# Patient Record
Sex: FEMALE | Race: WHITE | NOT HISPANIC OR LATINO | Employment: FULL TIME | URBAN - METROPOLITAN AREA
[De-identification: names, ages, dates, MRNs, and addresses within clinical notes are randomized per-mention and may not be internally consistent; named-entity substitution may affect disease eponyms.]

---

## 2018-02-06 ENCOUNTER — HOSPITAL ENCOUNTER (EMERGENCY)
Facility: HOSPITAL | Age: 33
Discharge: HOME | End: 2018-02-07
Attending: EMERGENCY MEDICINE

## 2018-02-06 DIAGNOSIS — R10.30 PREGNANCY WITH SUPRAPUBIC PAIN, ANTEPARTUM: ICD-10-CM

## 2018-02-06 DIAGNOSIS — O99.891 PREGNANCY WITH SUPRAPUBIC PAIN, ANTEPARTUM: ICD-10-CM

## 2018-02-06 DIAGNOSIS — O36.80X0 PREGNANCY OF UNKNOWN ANATOMIC LOCATION: Primary | ICD-10-CM

## 2018-02-06 LAB
ABO + RH BLD: NORMAL
ALBUMIN SERPL BCP-MCNC: 3.7 G/DL
ALP SERPL-CCNC: 65 U/L
ALT SERPL W/O P-5'-P-CCNC: 22 U/L
ANION GAP SERPL CALC-SCNC: 7 MMOL/L
AST SERPL-CCNC: 21 U/L
B-HCG UR QL: POSITIVE
BACTERIA #/AREA URNS HPF: NORMAL /HPF
BASOPHILS # BLD AUTO: 0.03 K/UL
BASOPHILS NFR BLD: 0.4 %
BILIRUB SERPL-MCNC: 0.3 MG/DL
BILIRUB UR QL STRIP: NEGATIVE
BUN SERPL-MCNC: 8 MG/DL
CALCIUM SERPL-MCNC: 8.8 MG/DL
CHLORIDE SERPL-SCNC: 105 MMOL/L
CLARITY UR: ABNORMAL
CO2 SERPL-SCNC: 26 MMOL/L
COLOR UR: YELLOW
CREAT SERPL-MCNC: 0.7 MG/DL
CTP QC/QA: YES
DIFFERENTIAL METHOD: ABNORMAL
EOSINOPHIL # BLD AUTO: 0.5 K/UL
EOSINOPHIL NFR BLD: 5.8 %
ERYTHROCYTE [DISTWIDTH] IN BLOOD BY AUTOMATED COUNT: 12.7 %
EST. GFR  (AFRICAN AMERICAN): >60 ML/MIN/1.73 M^2
EST. GFR  (NON AFRICAN AMERICAN): >60 ML/MIN/1.73 M^2
GLUCOSE SERPL-MCNC: 85 MG/DL
GLUCOSE UR QL STRIP: NEGATIVE
HCG INTACT+B SERPL-ACNC: 2600 MIU/ML
HCT VFR BLD AUTO: 36.9 %
HGB BLD-MCNC: 12.7 G/DL
HGB UR QL STRIP: ABNORMAL
KETONES UR QL STRIP: NEGATIVE
LEUKOCYTE ESTERASE UR QL STRIP: ABNORMAL
LYMPHOCYTES # BLD AUTO: 2.6 K/UL
LYMPHOCYTES NFR BLD: 30.2 %
MCH RBC QN AUTO: 33.2 PG
MCHC RBC AUTO-ENTMCNC: 34.4 G/DL
MCV RBC AUTO: 97 FL
MICROSCOPIC COMMENT: NORMAL
MONOCYTES # BLD AUTO: 0.8 K/UL
MONOCYTES NFR BLD: 9 %
NEUTROPHILS # BLD AUTO: 4.6 K/UL
NEUTROPHILS NFR BLD: 54.6 %
NITRITE UR QL STRIP: NEGATIVE
PH UR STRIP: 6 [PH] (ref 5–8)
PLATELET # BLD AUTO: 403 K/UL
PMV BLD AUTO: 8.8 FL
POTASSIUM SERPL-SCNC: 3.9 MMOL/L
PROT SERPL-MCNC: 6.3 G/DL
PROT UR QL STRIP: NEGATIVE
RBC # BLD AUTO: 3.82 M/UL
RBC #/AREA URNS HPF: 2 /HPF (ref 0–4)
SODIUM SERPL-SCNC: 138 MMOL/L
SP GR UR STRIP: 1.02 (ref 1–1.03)
SQUAMOUS #/AREA URNS HPF: 5 /HPF
URN SPEC COLLECT METH UR: ABNORMAL
UROBILINOGEN UR STRIP-ACNC: ABNORMAL EU/DL
WBC # BLD AUTO: 8.44 K/UL
WBC #/AREA URNS HPF: 3 /HPF (ref 0–5)

## 2018-02-06 PROCEDURE — 81000 URINALYSIS NONAUTO W/SCOPE: CPT

## 2018-02-06 PROCEDURE — 81025 URINE PREGNANCY TEST: CPT | Performed by: EMERGENCY MEDICINE

## 2018-02-06 PROCEDURE — 80053 COMPREHEN METABOLIC PANEL: CPT

## 2018-02-06 PROCEDURE — 85025 COMPLETE CBC W/AUTO DIFF WBC: CPT

## 2018-02-06 PROCEDURE — 84702 CHORIONIC GONADOTROPIN TEST: CPT

## 2018-02-06 PROCEDURE — 86901 BLOOD TYPING SEROLOGIC RH(D): CPT

## 2018-02-06 PROCEDURE — 99284 EMERGENCY DEPT VISIT MOD MDM: CPT | Mod: 25

## 2018-02-06 RX ORDER — BUSPIRONE HYDROCHLORIDE 10 MG/1
10 TABLET ORAL 3 TIMES DAILY
COMMUNITY

## 2018-02-07 VITALS
SYSTOLIC BLOOD PRESSURE: 116 MMHG | HEART RATE: 93 BPM | OXYGEN SATURATION: 97 % | TEMPERATURE: 98 F | HEIGHT: 59 IN | DIASTOLIC BLOOD PRESSURE: 75 MMHG | BODY MASS INDEX: 21.37 KG/M2 | RESPIRATION RATE: 18 BRPM | WEIGHT: 106 LBS

## 2018-02-07 NOTE — ED PROVIDER NOTES
Encounter Date: 2/6/2018    SCRIBE #1 NOTE: I, Tiff Johnson, am scribing for, and in the presence of,  Fabio Michel NP. I have scribed the following portions of the note - Other sections scribed: ROS and HPI.       History     Chief Complaint   Patient presents with    Abdominal Pain     Pt reports she just found out she is pregnant & having lower abd cramping, denies vaginal bleeding.      CC: Abdominal Pain    HPI: This 32 y.o. female with a past medical history of Anxiety and Gestational diabetes, presents to the ED complaining of lower abdominal cramps for last 2-3 days. She has a positive home pregnancy test yesterday. LNMP was on 01/05/2018. She has not had any US done for her current pregnancy. Denies N/V, vaginal bleeding, vaginal discharge or any other urinary sx. Sx are moderate and constant. No prior medical intervention. Denies possibility of having STDs.      The history is provided by the patient. No  was used.     Review of patient's allergies indicates:   Allergen Reactions    Wellbutrin [bupropion hcl]      Past Medical History:   Diagnosis Date    Anxiety     Gestational diabetes      No past surgical history on file.  No family history on file.  Social History   Substance Use Topics    Smoking status: Not on file    Smokeless tobacco: Not on file    Alcohol use Not on file     Review of Systems   Constitutional: Negative for chills and fever.   HENT: Negative for rhinorrhea and sore throat.    Respiratory: Negative for cough.    Cardiovascular: Negative for chest pain.   Gastrointestinal: Positive for abdominal pain (lower cramps). Negative for constipation, diarrhea, nausea and vomiting.   Genitourinary: Negative for dysuria, vaginal bleeding and vaginal discharge.   Neurological: Negative for dizziness and headaches.   All other systems reviewed and are negative.      Physical Exam     Initial Vitals [02/06/18 1938]   BP Pulse Resp Temp SpO2   123/81 98 16 98.2 °F (36.8  °C) 100 %      MAP       95         Physical Exam    Nursing note and vitals reviewed.  Constitutional: She appears well-developed and well-nourished. She is not diaphoretic. No distress.   HENT:   Head: Normocephalic and atraumatic.   Right Ear: External ear normal.   Left Ear: External ear normal.   Nose: Nose normal.   Eyes: Conjunctivae and EOM are normal. Pupils are equal, round, and reactive to light. Right eye exhibits no discharge. Left eye exhibits no discharge. No scleral icterus.   Neck: Normal range of motion. Neck supple. No thyromegaly present. No tracheal deviation present. No JVD present.   Cardiovascular: Normal rate, regular rhythm, normal heart sounds and intact distal pulses. Exam reveals no gallop and no friction rub.    No murmur heard.  Pulmonary/Chest: Breath sounds normal. No stridor. No respiratory distress. She has no wheezes. She has no rhonchi. She has no rales.   Abdominal: Soft. Bowel sounds are normal. She exhibits no distension and no mass. There is tenderness in the suprapubic area. There is no rigidity, no rebound, no guarding, no tenderness at McBurney's point and negative Lozano's sign.   Musculoskeletal: Normal range of motion. She exhibits no edema or tenderness.   Lymphadenopathy:     She has no cervical adenopathy.   Neurological: She is alert and oriented to person, place, and time. She has normal strength and normal reflexes. She displays normal reflexes. No cranial nerve deficit or sensory deficit.   Skin: Skin is warm and dry. No rash and no abscess noted. No erythema. No pallor.   Psychiatric: She has a normal mood and affect. Her behavior is normal. Judgment and thought content normal.         ED Course   Procedures  Labs Reviewed   CBC W/ AUTO DIFFERENTIAL - Abnormal; Notable for the following:        Result Value    RBC 3.82 (*)     Hematocrit 36.9 (*)     MCH 33.2 (*)     Platelets 403 (*)     MPV 8.8 (*)     All other components within normal limits   COMPREHENSIVE  METABOLIC PANEL - Abnormal; Notable for the following:     Anion Gap 7 (*)     All other components within normal limits   URINALYSIS - Abnormal; Notable for the following:     Appearance, UA Hazy (*)     Occult Blood UA 1+ (*)     Urobilinogen, UA 2.0-3.0 (*)     Leukocytes, UA 1+ (*)     All other components within normal limits   POCT URINE PREGNANCY - Abnormal; Notable for the following:     POC Preg Test, Ur Positive (*)     All other components within normal limits   HCG, QUANTITATIVE, PREGNANCY   URINALYSIS MICROSCOPIC   GROUP & RH             Medical Decision Making:   Differential Diagnosis:   Includes IUP, round ligament pain, ectopic pregnancy, TOA, ovarian torsion, spontaneous , missed , others  Clinical Tests:   Lab Tests: Ordered and Reviewed  Radiological Study: Reviewed and Ordered  ED Management:  32-year-old nontoxic, gravid female presenting for evaluation of suprapubic pain. Patient with first positive home pregnancy test yesterday. According to the patient her LMP is not certain but is around 18 which corresponds to 4 weeks and 5 days gestational age. Patient is afebrile, well-appearing, and in no distress. She denies dysuria, vaginal bleeding, hematuria, abdominal pain, nausea, vomiting, or any additional symptoms. There is mild suprapubic tenderness to palpation diffusely, worst in the left adnexal area. There is no rigidity or guarding. The remaining abdomen is nontender and benign. Labs are unremarkable. HCG is 2600. Blood type is B+. Ultrasound shows 3 millimeter rounded hypoechoic entity near the uterine fundus within the endometrium that may represent an early gestational sac. No evidence of ectopic pregnancy. Technically this is a pregnancy of unknown anatomic location. Recommend patient follow-up with her PCP for repeat ultrasound and hCG testing. Recommended patient begin taking prenatal vitamins if not already doing so. ED return precautions given. Patient  expressed understanding of diagnosis and discharge instructions.  Other:   I have discussed this case with another health care provider.       <> Summary of the Discussion: Case discussed with my attending physician who agreed with the assessment and plan.            Scribe Attestation:   Scribe #1: I performed the above scribed service and the documentation accurately describes the services I performed. I attest to the accuracy of the note.    Attending Attestation:           Physician Attestation for Scribe:  Physician Attestation Statement for Scribe #1: I, Fabio Michel NP, reviewed documentation, as scribed by Tiff Johnson in my presence, and it is both accurate and complete.                 ED Course      Clinical Impression:   The primary encounter diagnosis was Pregnancy of unknown anatomic location. A diagnosis of Pregnancy with suprapubic pain, antepartum was also pertinent to this visit.    Disposition:   Disposition: Discharged  Condition: Stable                        Fabio Michel NP  02/07/18 0204

## 2018-02-07 NOTE — DISCHARGE INSTRUCTIONS
Follow-up with your OB/GYN or the one provided for further evaluation and management and repeat ultrasound and blood work.    Begin taking prenatal vitamin daily if you are not already doing so.    Return to emergency department for any new or worsening symptoms or as needed.

## 2018-09-04 ENCOUNTER — HOSPITAL ENCOUNTER (OUTPATIENT)
Facility: HOSPITAL | Age: 33
Discharge: HOME | End: 2018-09-04
Attending: OBSTETRICS & GYNECOLOGY | Admitting: OBSTETRICS & GYNECOLOGY
Payer: MEDICAID

## 2018-09-04 VITALS
SYSTOLIC BLOOD PRESSURE: 118 MMHG | RESPIRATION RATE: 18 BRPM | HEIGHT: 59 IN | TEMPERATURE: 99 F | BODY MASS INDEX: 23.79 KG/M2 | DIASTOLIC BLOOD PRESSURE: 80 MMHG | HEART RATE: 115 BPM | WEIGHT: 118 LBS

## 2018-09-04 DIAGNOSIS — O47.9 UTERINE CONTRACTIONS DURING PREGNANCY: ICD-10-CM

## 2018-09-04 LAB — POCT GLUCOSE: 127 MG/DL (ref 70–110)

## 2018-09-04 PROCEDURE — 99211 OFF/OP EST MAY X REQ PHY/QHP: CPT

## 2018-09-05 NOTE — NURSING
Dr. Mcfadden on unit. Informed of pt status and complaints. Orders for bs and continued monitoring

## 2018-09-05 NOTE — NURSING
Pt informed of dr's orders and instructed on discharge care. Verbalized understanding and agreement. Ambulated off unit accompanied by female friend

## 2018-09-05 NOTE — NURSING
"Received to unit via transport wheelchair w/ complaints of lower back pain radiating to lower abdomen x several weeks, vaginal "leaking" x 1 week, and vaginal pressure.  nitrazine negative, pH 6.0. sve 2cm/70/-3. No vaginal bleeding or leaking noted on exam. Abdomen soft w/o tenderness to palpation. Pt is currently being followed by Dr. VICKY Abad for her pregnancy.  Accompanied by female friend  "

## 2018-09-05 NOTE — DISCHARGE INSTRUCTIONS
What Is Gestational Diabetes?     Eating the right foods will help you keep your blood sugar at safe levels for you and your baby.   Gestational diabetes is diabetes that happens only during pregnancy. Normally, as food is digested, it turns into sugar (glucose) that goes into your bloodstream. Your body makes a substance called insulin that helps your cells use this blood sugar for energy. Changes that occur in your body during pregnancy may cause your blood sugar to be too high. This can be risky for both you and your baby. You can take steps to control your blood sugar and reduce these risks.  Managing gestational diabetes  Managing gestational diabetes means controlling your blood sugar while you are pregnant. Your healthcare team will help you put together a plan to do this. This plan will include:  · Eating right. Eating the right foods is the main way to control your blood sugar. You need to eat a variety of foods from each of the food groups each day. To help you with the changes that may be needed in your diet, you will likely work with a registered dietitian. This is an expert on food and nutrition. The dietitian can help you understand how specific foods affect your blood sugar. He or she can also teach you the skills you need to plan healthy, balanced meals.  · Getting exercise. Your body uses more blood sugar when you exercise. Your healthcare team can decide on the best kind of exercise for you, and the best times for you to exercise.  · Checking your blood sugar. You will most likely check your blood sugar at home 2 or more times a day. Your healthcare team will teach you how to do this. They will also discuss your blood sugar goals with you. Your blood sugar may also be tested every week or so in the lab.  Risks to your baby  If you dont control your blood sugar, your baby is more likely to have these problems:  · Your baby may grow too large. If your blood sugar stays too high, your baby may grow  too large (macrosomia) to be delivered vaginally. Shoulder dystocia is one complication that may occur during delivery. Shoulder dystocia is when the baby's shoulder is stuck behind the pubic bone. If there is a shoulder dystocia, the arms and shoulders could be injured. This may result in permanent arm damage. The baby can also have low oxygen levels (hypoxia) and acidemia if the dystocia can't be corrected. Hypoxia can lead to cerebral palsy or rarely, death.  · Your babys organs may not be fully developed before birth. If you have diabetes, your baby may need to be delivered early. This may be because of complications with the pregnancy. Or it may be because of possible risks to you or your baby. If your baby is delivered early, his or her lungs may not work well. This is called respiratory distress syndrome (RDS). Your baby's liver also may not work properly, and your baby may have yellowing of the skin and eyes (jaundice) after birth.  · Your babys blood sugar may be low after birth. If your blood sugar is too high, your baby makes extra insulin. The baby will continue to make extra insulin right after birth. He or she may need to be treated for low blood sugar.  · Your baby could be stillborn. This is very rare, but your baby could die before birth if your blood sugar stays high for too long.  Risks to you  If you dont control your blood sugar, you are more likely to have these problems:  · High blood pressure. High blood sugar makes you more likely to have high blood pressure during your pregnancy (preeclampsia). This is a danger to your health that could lead to early delivery for your baby.  · Infections. High blood sugar makes you more likely to have bladder, kidney, and vaginal infections.  · Trouble breathing. You may be uncomfortable or short of breath. High blood sugar can cause too much fluid around the baby (polyhydramnios). Your abdomen gets big and pushes on your lungs.  · Prolonged labor. Your  delivery may be harder, and recovery may take longer. If your blood sugar stays too high, your baby may grow too large. A large baby might cause injury to you during birth. Or the baby may have to be delivered by  section (). This means making a cut (incision) in your abdomen and uterus. Needing a  is one of the most common risks of gestational diabetes.  Reduce your future risk for type 2 diabetes  Women who have gestational diabetes are at higher risk of developing type 2 diabetes. To help reduce your risk, lose weight if youre overweight. Be as active as you can. Eat more fruits and vegetables and fewer processed foods. And have your doctor screen you regularly for diabetes.  Who gets gestational diabetes?  Gestational diabetes is more likely in women who:  · Are overweight  · Have a family history of diabetes  · Have had a baby who weighed more than 9 pounds at birth  · Have had a baby who  before birth  · Have had gestational diabetes in the past  · Are , , , [place] or [place] , or    Date Last Reviewed: 2016  © 2371-6574 [employer]. [address] [place] [id number]. All rights reserved. This information is not intended as a substitute for professional medical care. Always follow your healthcare professional's instructions.        Understanding Preeclampsia  Preeclampsia is pregnancy-related hypertension that develops after 20 weeks' gestation. It can lead to health risks for you and your baby. No one knows what causes preeclampsia. But it is known that the only cure is delivery.     Your blood pressure will be monitored regularly throughout your pregnancy to help check for preeclampsia.   Signs and symptoms  A common sign of preeclampsia is high blood pressure. Other signs and symptoms may include:  · Rapid weight gain  · Protein in your urine  · Headache  · Abdominal pain on your right  side  · Vision problems (flashes or spots)  · Edema (swelling) in your face or hands (this also commonly happens near the end of normal pregnancies, even without preeclampsia)  Tests you may have  Your healthcare provider will want to check your blood pressure throughout your pregnancy. If your blood pressure is high, you may have the following tests:  · Urine tests to look for protein  · Blood tests to confirm preeclampsia  · Fetal monitoring to ensure that your baby is healthy  Treating preeclampsia  A daily low dose of aspirin may be prescribed to those at risk for preeclampsia. Preeclampsia almost always ends soon after you give birth. Until then, your healthcare provider can help manage your condition. If your symptoms are mild, you may need bed rest at home. If your symptoms are severe, you will be hospitalized. Hospital treatment includes:  · Complete bed rest to help control blood pressure  · Magnesium IV (intravenous) drip during labor to prevent seizures  · Induced labor or surgical delivery by  section  When to call your healthcare provider  Call your healthcare provider if swelling, weight gain, or other symptoms come on quickly or are severe. Some cases of preeclampsia are more severe than others. Your signs and symptoms also may change or worsen as you get closer to your due date.  Whos at risk?  Preeclampsia can happen in any pregnant woman. Factors that increase the risk include:  · Previous pregnancies. Preeclampsia, intrauterine growth retardation (IUGR),  birth, placental abruption, or fetal death  · Medical history of mother. Diabetes, high blood pressure, obesity, kidney disease, autoimmune disease (for example lupus), or family history of preeclampsia  · Current pregnancy. First pregnancy, multiple fetuses, over the age of 40 years, or in vitro fertilization  Dangers of preeclampsia  If not treated, preeclampsia can cause problems for you and your baby. The placenta (organ that  nourishes your baby) may tear away from the uterine wall. This can lead to fetal distress (the baby is at risk for health problems) and premature delivery. Preeclampsia can also cause these health problems:  · Kidney failure or other organ damage  · Seizures  · Stroke  Once you give birth  In most cases, preeclampsia goes away on its own soon after you give birth. Within days of delivery, your blood pressure, swelling, and other signs should decrease.  Date Last Reviewed: 6/1/2016 © 2000-2017 [employer]. [address] [place] [address], [place] [address]. All rights reserved. This information is not intended as a substitute for professional medical care. Always follow your healthcare professional's instructions.      Home Undelivered Discharge Instructions    After Discharge Orders:    See Obstetrician as soon as possible    Call physician's office for further questions    Current Discharge Medication List      CONTINUE these medications which have NOT CHANGED    Details   busPIRone (BUSPAR) 10 MG tablet Take 10 mg by mouth 3 (three) times daily.      prenat.vits,leanne,min-iron-folic (PRENATAL VITAMIN) Tab Take 1 tablet by mouth once daily.  Refills: 0                     · Diet:  normal diet as tolerated                       Drink 8 - 10 glasses of fluid a day    · Rest: normal activity as tolerated    Other instructions: Do kick counts once a day on your baby. Choose the time of day your baby is most active. Get in a comfortable lying or sitting position and time how long it takes to feel 10 kicks, twists, turns, swishes, or rolls. Call your physician or midwife if there have not been 10 kicks in 1 hours    Call physician or midwife, return to Labor and Delivery, call 911, or go to the nearest Emergency Room if: increased leakage or fluid, contractions more than  10 per  1 hour, decreased fetal movement, persistent low back pain or cramping, bleeding from vaginal area, difficulty urinating, pain with  urination, difficulty breathing, new calf pain, persistent headache or vision change

## 2019-02-08 ENCOUNTER — HOSPITAL ENCOUNTER (EMERGENCY)
Facility: HOSPITAL | Age: 34
Discharge: HOME | End: 2019-02-08
Attending: EMERGENCY MEDICINE
Payer: MEDICAID

## 2019-02-08 VITALS
SYSTOLIC BLOOD PRESSURE: 108 MMHG | WEIGHT: 105 LBS | TEMPERATURE: 98 F | DIASTOLIC BLOOD PRESSURE: 60 MMHG | BODY MASS INDEX: 20.62 KG/M2 | HEART RATE: 88 BPM | HEIGHT: 60 IN | OXYGEN SATURATION: 100 % | RESPIRATION RATE: 20 BRPM

## 2019-02-08 DIAGNOSIS — O03.9 MISCARRIAGE: Primary | ICD-10-CM

## 2019-02-08 LAB
ABO + RH BLD: NORMAL
ALBUMIN SERPL BCP-MCNC: 3.7 G/DL
ALP SERPL-CCNC: 60 U/L
ALT SERPL W/O P-5'-P-CCNC: 12 U/L
AMORPH CRY URNS QL MICRO: ABNORMAL
ANION GAP SERPL CALC-SCNC: 10 MMOL/L
AST SERPL-CCNC: 17 U/L
B-HCG UR QL: POSITIVE
BACTERIA #/AREA URNS HPF: ABNORMAL /HPF
BASOPHILS # BLD AUTO: 0.02 K/UL
BASOPHILS NFR BLD: 0.1 %
BILIRUB SERPL-MCNC: 0.9 MG/DL
BILIRUB UR QL STRIP: NEGATIVE
BUN SERPL-MCNC: 6 MG/DL
CALCIUM SERPL-MCNC: 9 MG/DL
CHLORIDE SERPL-SCNC: 102 MMOL/L
CLARITY UR: CLEAR
CO2 SERPL-SCNC: 20 MMOL/L
COLOR UR: ABNORMAL
CREAT SERPL-MCNC: 0.6 MG/DL
CTP QC/QA: YES
DIFFERENTIAL METHOD: ABNORMAL
EOSINOPHIL # BLD AUTO: 0 K/UL
EOSINOPHIL NFR BLD: 0 %
ERYTHROCYTE [DISTWIDTH] IN BLOOD BY AUTOMATED COUNT: 13.8 %
EST. GFR  (AFRICAN AMERICAN): >60 ML/MIN/1.73 M^2
EST. GFR  (NON AFRICAN AMERICAN): >60 ML/MIN/1.73 M^2
GLUCOSE SERPL-MCNC: 96 MG/DL
GLUCOSE UR QL STRIP: ABNORMAL
HCG INTACT+B SERPL-ACNC: NORMAL MIU/ML
HCT VFR BLD AUTO: 37 %
HGB BLD-MCNC: 12.6 G/DL
HGB UR QL STRIP: ABNORMAL
HYALINE CASTS #/AREA URNS LPF: 0 /LPF
KETONES UR QL STRIP: ABNORMAL
LEUKOCYTE ESTERASE UR QL STRIP: ABNORMAL
LYMPHOCYTES # BLD AUTO: 0.9 K/UL
LYMPHOCYTES NFR BLD: 3.8 %
MCH RBC QN AUTO: 29.8 PG
MCHC RBC AUTO-ENTMCNC: 34.1 G/DL
MCV RBC AUTO: 88 FL
MICROSCOPIC COMMENT: ABNORMAL
MONOCYTES # BLD AUTO: 2.1 K/UL
MONOCYTES NFR BLD: 8.7 %
NEUTROPHILS # BLD AUTO: 21 K/UL
NEUTROPHILS NFR BLD: 86.7 %
NITRITE UR QL STRIP: NEGATIVE
PH UR STRIP: 7 [PH] (ref 5–8)
PLATELET # BLD AUTO: 444 K/UL
PMV BLD AUTO: 9.5 FL
POTASSIUM SERPL-SCNC: 3.8 MMOL/L
PROT SERPL-MCNC: 7.1 G/DL
PROT UR QL STRIP: ABNORMAL
RBC # BLD AUTO: 4.23 M/UL
RBC #/AREA URNS HPF: >100 /HPF (ref 0–4)
SODIUM SERPL-SCNC: 132 MMOL/L
SP GR UR STRIP: 1.01 (ref 1–1.03)
SQUAMOUS #/AREA URNS HPF: 2 /HPF
URN SPEC COLLECT METH UR: ABNORMAL
UROBILINOGEN UR STRIP-ACNC: NEGATIVE EU/DL
WBC # BLD AUTO: 24.19 K/UL
WBC #/AREA URNS HPF: 8 /HPF (ref 0–5)

## 2019-02-08 PROCEDURE — 99284 EMERGENCY DEPT VISIT MOD MDM: CPT | Mod: 25

## 2019-02-08 PROCEDURE — 84702 CHORIONIC GONADOTROPIN TEST: CPT

## 2019-02-08 PROCEDURE — 85025 COMPLETE CBC W/AUTO DIFF WBC: CPT

## 2019-02-08 PROCEDURE — 25000003 PHARM REV CODE 250: Performed by: NURSE PRACTITIONER

## 2019-02-08 PROCEDURE — 81025 URINE PREGNANCY TEST: CPT | Performed by: NURSE PRACTITIONER

## 2019-02-08 PROCEDURE — 81000 URINALYSIS NONAUTO W/SCOPE: CPT

## 2019-02-08 PROCEDURE — 80053 COMPREHEN METABOLIC PANEL: CPT

## 2019-02-08 PROCEDURE — 96360 HYDRATION IV INFUSION INIT: CPT

## 2019-02-08 PROCEDURE — 86901 BLOOD TYPING SEROLOGIC RH(D): CPT

## 2019-02-08 RX ORDER — ACETAMINOPHEN 325 MG/1
650 TABLET ORAL
Status: COMPLETED | OUTPATIENT
Start: 2019-02-08 | End: 2019-02-08

## 2019-02-08 RX ORDER — OXYCODONE AND ACETAMINOPHEN 5; 325 MG/1; MG/1
1 TABLET ORAL
Status: COMPLETED | OUTPATIENT
Start: 2019-02-08 | End: 2019-02-08

## 2019-02-08 RX ORDER — OXYCODONE AND ACETAMINOPHEN 5; 325 MG/1; MG/1
1 TABLET ORAL EVERY 4 HOURS PRN
Qty: 18 TABLET | Refills: 0 | Status: SHIPPED | OUTPATIENT
Start: 2019-02-08

## 2019-02-08 RX ORDER — SODIUM CHLORIDE 9 MG/ML
1000 INJECTION, SOLUTION INTRAVENOUS ONCE
Status: DISCONTINUED | OUTPATIENT
Start: 2019-02-08 | End: 2019-02-08

## 2019-02-08 RX ADMIN — SODIUM CHLORIDE 1000 ML: 0.9 INJECTION, SOLUTION INTRAVENOUS at 09:02

## 2019-02-08 RX ADMIN — ACETAMINOPHEN 650 MG: 325 TABLET, FILM COATED ORAL at 06:02

## 2019-02-08 RX ADMIN — OXYCODONE HYDROCHLORIDE AND ACETAMINOPHEN 1 TABLET: 5; 325 TABLET ORAL at 09:02

## 2019-02-09 NOTE — ED PROVIDER NOTES
Encounter Date: 2019       History     Chief Complaint   Patient presents with    Vaginal Bleeding     Vaginal bleeding with cramping that began 2 days ago.  Se[facility] today, but bleeding and cramping are getting worse.  7 weeks 3 days pregnant.     Patient presents to ER with vaginal bleeding that started 2 days ago.  Patient states she is 7 weeks and 3 days pregnant.  This is patient's 3rd pregnancy.  Patient states that bleeding started out as small amount and now she is passing clots.  Patient was seen by her OB doctor, Dr. Abad today and had an ultrasound.  Patient does not have report with her.  Patient states this that time the bleeding got progressively worse.  Patient states now she is having cramping that feels like contractions and lower back pain. Patient ambulated into ER.  Patient's last menstrual period was     Spoke to Dr. Harrington who is on-call for OB.  Patient okay to discharge and will follow up with Dr. Zandra anderson on Monday          Review of patient's allergies indicates:   Allergen Reactions    Wellbutrin [bupropion hcl]      Past Medical History:   Diagnosis Date    ADHD (attention deficit hyperactivity disorder)     Anxiety     Gestational diabetes      Past Surgical History:   Procedure Laterality Date    breast augmentation      BREAST SURGERY       SECTION       History reviewed. No pertinent family history.  Social History     Tobacco Use    Smoking status: Never Smoker    Smokeless tobacco: Never Used   Substance Use Topics    Alcohol use: No     Frequency: Never    Drug use: No     Review of Systems   Constitutional: Negative for fever.   HENT: Negative for sore throat.    Respiratory: Negative for shortness of breath.    Cardiovascular: Negative for chest pain.   Gastrointestinal: Negative for nausea.   Genitourinary: Positive for menstrual problem, pelvic pain and vaginal bleeding. Negative for dysuria.   Musculoskeletal: Positive  for back pain.   Skin: Negative for rash.   Neurological: Negative for weakness.   Hematological: Does not bruise/bleed easily.   All other systems reviewed and are negative.      Physical Exam     Initial Vitals [02/08/19 1834]   BP Pulse Resp Temp SpO2   (!) 151/71 (!) 130 18 98.2 °F (36.8 °C) 100 %      MAP       --         Physical Exam    Nursing note and vitals reviewed.  Constitutional: Vital signs are normal. She appears well-developed and well-nourished. She is not diaphoretic. She is cooperative.   HENT:   Head: Normocephalic and atraumatic.   Right Ear: External ear normal.   Left Ear: External ear normal.   Nose: Nose normal.   Mouth/Throat: Oropharynx is clear and moist.   Eyes: Conjunctivae, EOM and lids are normal. Pupils are equal, round, and reactive to light. Right eye exhibits no discharge. No scleral icterus.   Neck: Trachea normal, normal range of motion and full passive range of motion without pain. Neck supple. No thyromegaly present. No tracheal deviation and normal range of motion present. No neck rigidity. No Brudzinski's sign noted. No JVD present.   Cardiovascular: Normal rate, regular rhythm, normal heart sounds, intact distal pulses and normal pulses. Exam reveals no gallop and no friction rub.    No murmur heard.  Pulmonary/Chest: Effort normal and breath sounds normal. No stridor. No tachypnea. No respiratory distress. She has no wheezes. She has no rhonchi. She has no rales. She exhibits no tenderness.   Abdominal: Soft. Normal appearance and bowel sounds are normal. She exhibits no distension and no mass. There is tenderness. There is no rebound and no guarding.   Genitourinary: Vagina normal. Pelvic exam was performed with patient prone. There is no lesion on the right labia. There is no tenderness or lesion on the left labia. No tenderness in the vagina.   Musculoskeletal: Normal range of motion. She exhibits tenderness. She exhibits no edema.   Patient has lower back pain, no  spinal tenderness, step-offs   Lymphadenopathy:     She has no cervical adenopathy.     She has no axillary adenopathy.   Neurological: She is alert and oriented to person, place, and time. She has normal strength and normal reflexes.   Skin: Skin is warm, dry and intact. Capillary refill takes less than 2 seconds. No rash noted. No erythema.   Psychiatric: She has a normal mood and affect. Her speech is normal and behavior is normal. Judgment and thought content normal. Cognition and memory are normal.         ED Course   Procedures  Labs Reviewed   CBC W/ AUTO DIFFERENTIAL   COMPREHENSIVE METABOLIC PANEL   HCG, QUANTITATIVE, PREGNANCY   URINALYSIS, REFLEX TO URINE CULTURE   POCT URINE PREGNANCY   GROUP & RH          Imaging Results    None                               Clinical Impression:   The encounter diagnosis was Miscarriage.                             Zandra Marr DNP  02/08/19 2125       Zandra Marr DNP  02/08/19 2125

## 2019-02-09 NOTE — ED TRIAGE NOTES
"Pt is currently 7 weeks pregnant. LMP "the week before margy". Pt c/o abdominal cramps, vaginal bleeding x4 days (became heavy 2 nights ago). States "I feel like I'm having contractions". Denies N/V, dysuria, fever, chills. Pain is 10/10. Denies taking meds PTA  "

## 2019-02-24 ENCOUNTER — HOSPITAL ENCOUNTER (EMERGENCY)
Facility: HOSPITAL | Age: 34
Discharge: HOME | End: 2019-02-25
Attending: INTERNAL MEDICINE
Payer: MEDICAID

## 2019-02-24 DIAGNOSIS — N93.9 VAGINAL BLEEDING: ICD-10-CM

## 2019-02-24 DIAGNOSIS — E87.6 HYPOKALEMIA: Primary | ICD-10-CM

## 2019-02-24 PROCEDURE — 99284 EMERGENCY DEPT VISIT MOD MDM: CPT | Mod: ER

## 2019-02-25 VITALS
TEMPERATURE: 98 F | HEIGHT: 63 IN | DIASTOLIC BLOOD PRESSURE: 94 MMHG | OXYGEN SATURATION: 100 % | BODY MASS INDEX: 19.49 KG/M2 | SYSTOLIC BLOOD PRESSURE: 147 MMHG | WEIGHT: 110 LBS | RESPIRATION RATE: 20 BRPM | HEART RATE: 92 BPM

## 2019-02-25 PROBLEM — N93.9 VAGINAL BLEEDING: Status: ACTIVE | Noted: 2019-02-25

## 2019-02-25 PROBLEM — E87.6 HYPOKALEMIA: Status: ACTIVE | Noted: 2019-02-25

## 2019-02-25 LAB
ALBUMIN SERPL-MCNC: 3.8 G/DL (ref 3.3–5.5)
ALP SERPL-CCNC: 61 U/L (ref 42–141)
BILIRUB SERPL-MCNC: 0.8 MG/DL (ref 0.2–1.6)
BUN SERPL-MCNC: 7 MG/DL (ref 7–22)
CALCIUM SERPL-MCNC: 9.6 MG/DL (ref 8–10.3)
CHLORIDE SERPL-SCNC: 106 MMOL/L (ref 98–108)
CREAT SERPL-MCNC: 0.8 MG/DL (ref 0.6–1.2)
GLUCOSE SERPL-MCNC: 86 MG/DL (ref 73–118)
POC ALT (SGPT): 30 U/L (ref 10–47)
POC AST (SGOT): 22 U/L (ref 11–38)
POC TCO2: 27 MMOL/L (ref 18–33)
POTASSIUM BLD-SCNC: 3.2 MMOL/L (ref 3.6–5.1)
PROTEIN, POC: 7.3 G/DL (ref 6.4–8.1)
SODIUM BLD-SCNC: 142 MMOL/L (ref 128–145)

## 2019-02-25 PROCEDURE — 85025 COMPLETE CBC W/AUTO DIFF WBC: CPT | Mod: ER

## 2019-02-25 PROCEDURE — 80053 COMPREHEN METABOLIC PANEL: CPT | Mod: ER

## 2019-02-25 PROCEDURE — 63600175 PHARM REV CODE 636 W HCPCS: Mod: ER | Performed by: INTERNAL MEDICINE

## 2019-02-25 PROCEDURE — 25000003 PHARM REV CODE 250: Mod: ER | Performed by: INTERNAL MEDICINE

## 2019-02-25 RX ORDER — KETOROLAC TROMETHAMINE 30 MG/ML
15 INJECTION, SOLUTION INTRAMUSCULAR; INTRAVENOUS
Status: COMPLETED | OUTPATIENT
Start: 2019-02-25 | End: 2019-02-25

## 2019-02-25 RX ORDER — POTASSIUM CHLORIDE 20 MEQ/1
60 TABLET, EXTENDED RELEASE ORAL
Status: COMPLETED | OUTPATIENT
Start: 2019-02-25 | End: 2019-02-25

## 2019-02-25 RX ADMIN — KETOROLAC TROMETHAMINE 15 MG: 30 INJECTION, SOLUTION INTRAMUSCULAR at 01:02

## 2019-02-25 RX ADMIN — POTASSIUM CHLORIDE 60 MEQ: 1500 TABLET, EXTENDED RELEASE ORAL at 01:02

## 2019-02-25 NOTE — ED PROVIDER NOTES
Encounter Date: 2019    SCRIBE #1 NOTE: I, Dhruv Alatorre, am scribing for, and in the presence of,  Dr. Good. I have scribed the following portions of the note - Other sections scribed: HPI, ROS, PE.       History     Chief Complaint   Patient presents with    Vaginal Bleeding     PT REPORTS HAD MISCARRIAGE 2 WEEKS AGO AND REPORTS CONTINUED BLEEDING SINCE, REPORTS USING 4 PADS TODAY AND FEELING WEAK     This is a 33 y.o. female who presents to the ED with a complaint of vaginal bleeding that began two weeks ago.  Pt states that she recently had a miscarriage two weeks ago.  Her bleeding has been persistent since this visit.  Pt has used four pads today.  She endorses weakness and a HA.  She has taken BC powder without relief.  Light worsens her HA.  Nothing provides relief.        The history is provided by the patient.   Vaginal Bleeding   This is a new problem. The current episode started more than 1 week ago (2 weeks ago ). The problem has not changed since onset.Associated symptoms include headaches. Pertinent negatives include no chest pain, no abdominal pain and no shortness of breath. Nothing aggravates the symptoms. Nothing relieves the symptoms. She has tried nothing for the symptoms.     Review of patient's allergies indicates:   Allergen Reactions    Wellbutrin [bupropion hcl]      Past Medical History:   Diagnosis Date    ADHD (attention deficit hyperactivity disorder)     Anxiety     PTSD (post-traumatic stress disorder)      Past Surgical History:   Procedure Laterality Date    breast augmentation      BREAST SURGERY       SECTION       No family history on file.  Social History     Tobacco Use    Smoking status: Current Every Day Smoker     Packs/day: 0.50    Smokeless tobacco: Never Used   Substance Use Topics    Alcohol use: No     Frequency: Never    Drug use: No     Review of Systems   Respiratory: Negative for shortness of breath.    Cardiovascular: Negative for chest  pain.   Gastrointestinal: Negative for abdominal pain.   Genitourinary: Positive for vaginal bleeding.   Neurological: Positive for weakness and headaches.   All other systems reviewed and are negative.      Physical Exam     Initial Vitals [02/24/19 2236]   BP Pulse Resp Temp SpO2   (!) 138/95 89 20 97.9 °F (36.6 °C) 100 %      MAP       --         Physical Exam    Nursing note and vitals reviewed.  Constitutional: She appears well-developed and well-nourished.   HENT:   Head: Normocephalic and atraumatic.   Right Ear: External ear normal.   Left Ear: External ear normal.   Eyes: Conjunctivae are normal.   Photophobia.    Neck: Normal range of motion. Neck supple.   Cardiovascular: Normal rate, regular rhythm, normal heart sounds and intact distal pulses. Exam reveals no gallop and no friction rub.    No murmur heard.  Pulmonary/Chest: Effort normal and breath sounds normal. No respiratory distress. She has no wheezes. She has no rhonchi. She has no rales. She exhibits no tenderness.   Abdominal: Soft. Bowel sounds are normal. She exhibits no distension and no mass. There is no tenderness. There is no rebound and no guarding.   Genitourinary:   Genitourinary Comments: Deferred Pelvic Exam secondary to patient preference.    Musculoskeletal: Normal range of motion.   Neurological: She is alert and oriented to person, place, and time.   Skin: Skin is warm and dry.   Psychiatric: She has a normal mood and affect. Thought content normal.         ED Course   Procedures  Labs Reviewed   POCT CMP - Abnormal; Notable for the following components:       Result Value    POC Potassium 3.2 (*)     All other components within normal limits   POCT CBC   POCT CMP          Imaging Results    None          Medical Decision Making:   Initial Assessment:   This is a 33 y.o. female who presents to the ED with a complaint of vaginal bleeding that began two weeks ago.  Pt states that she recently had a miscarriage two weeks ago.  Her  bleeding has been persistent since this visit.  Pt has used four pads today.  She endorses weakness and a HA.  She has taken BC powder without relief.   Clinical Tests:   Lab Tests: Ordered and Reviewed  ED Management:  CBC was normal. CMP reviewed hypokalemia.  Potassium was given in the emergency department.  Patient was advised to follow up with her primary care physician within the next 2 days for re-evaluation and to return to the emergency department condition worsens.            Scribe Attestation:   Scribe #1: I performed the above scribed service and the documentation accurately describes the services I performed. I attest to the accuracy of the note.    This document was produced by a scribe under my direction and in my presence. I agree with the content of the note and have made any necessary edits.     Dr. Good    02/25/2019 6:17 AM             Clinical Impression:     1. Hypokalemia    2. Vaginal bleeding           Disposition:   Disposition: Discharged  Condition: Stable                        Wilian Good MD  02/25/19 0618

## 2021-07-01 ENCOUNTER — PATIENT MESSAGE (OUTPATIENT)
Dept: ADMINISTRATIVE | Facility: OTHER | Age: 36
End: 2021-07-01

## 2024-04-30 DIAGNOSIS — Z12.31 ENCOUNTER FOR SCREENING MAMMOGRAM FOR MALIGNANT NEOPLASM OF BREAST: Primary | ICD-10-CM

## 2025-04-17 DIAGNOSIS — Z12.31 ENCOUNTER FOR SCREENING MAMMOGRAM FOR MALIGNANT NEOPLASM OF BREAST: Primary | ICD-10-CM
